# Patient Record
Sex: MALE | Race: WHITE | NOT HISPANIC OR LATINO | Employment: OTHER | URBAN - METROPOLITAN AREA
[De-identification: names, ages, dates, MRNs, and addresses within clinical notes are randomized per-mention and may not be internally consistent; named-entity substitution may affect disease eponyms.]

---

## 2024-11-20 ENCOUNTER — HOSPITAL ENCOUNTER (EMERGENCY)
Facility: HOSPITAL | Age: 74
Discharge: OTHER INSTITUTION | End: 2024-11-20
Attending: EMERGENCY MEDICINE
Payer: MEDICARE

## 2024-11-20 ENCOUNTER — APPOINTMENT (OUTPATIENT)
Dept: CARDIOLOGY | Facility: HOSPITAL | Age: 74
End: 2024-11-20
Payer: MEDICARE

## 2024-11-20 ENCOUNTER — APPOINTMENT (OUTPATIENT)
Dept: RADIOLOGY | Facility: HOSPITAL | Age: 74
End: 2024-11-20
Payer: MEDICARE

## 2024-11-20 VITALS
OXYGEN SATURATION: 95 % | DIASTOLIC BLOOD PRESSURE: 69 MMHG | WEIGHT: 200.62 LBS | SYSTOLIC BLOOD PRESSURE: 126 MMHG | BODY MASS INDEX: 29.71 KG/M2 | RESPIRATION RATE: 18 BRPM | HEART RATE: 57 BPM | HEIGHT: 69 IN | TEMPERATURE: 99.8 F

## 2024-11-20 DIAGNOSIS — R47.01 APHASIA: Primary | ICD-10-CM

## 2024-11-20 PROBLEM — F41.9 ANXIETY: Status: ACTIVE | Noted: 2024-11-04

## 2024-11-20 PROBLEM — G20.C PARKINSONISM (MULTI): Status: ACTIVE | Noted: 2023-06-30

## 2024-11-20 PROBLEM — R73.9 HYPERGLYCEMIA: Status: ACTIVE | Noted: 2020-08-22

## 2024-11-20 PROBLEM — H90.5 SENSORINEURAL HEARING LOSS: Status: ACTIVE | Noted: 2017-08-17

## 2024-11-20 PROBLEM — K57.30 DIVERTICULAR DISEASE OF COLON: Status: ACTIVE | Noted: 2022-09-03

## 2024-11-20 PROBLEM — E66.3 OVERWEIGHT WITH BODY MASS INDEX (BMI) 25.0-29.9: Status: ACTIVE | Noted: 2021-11-09

## 2024-11-20 PROBLEM — M14.60 CHARCOT'S JOINT: Status: ACTIVE | Noted: 2018-06-27

## 2024-11-20 PROBLEM — R51.9 CHRONIC DAILY HEADACHE: Status: ACTIVE | Noted: 2023-11-16

## 2024-11-20 PROBLEM — G20.A2 PARKINSON'S DISEASE WITH FLUCTUATING MANIFESTATIONS: Status: ACTIVE | Noted: 2024-03-14

## 2024-11-20 PROBLEM — R44.3 HALLUCINATIONS: Status: ACTIVE | Noted: 2024-11-04

## 2024-11-20 PROBLEM — R39.15 URGENCY OF URINATION: Status: ACTIVE | Noted: 2023-05-09

## 2024-11-20 PROBLEM — G31.84 MILD NEUROCOGNITIVE DISORDER: Status: ACTIVE | Noted: 2023-06-30

## 2024-11-20 PROBLEM — G62.9 NEUROPATHY: Status: ACTIVE | Noted: 2020-05-07

## 2024-11-20 PROBLEM — N28.1 RENAL CYST: Status: ACTIVE | Noted: 2019-10-04

## 2024-11-20 PROBLEM — G89.29 CHRONIC BILATERAL LOW BACK PAIN WITH BILATERAL SCIATICA: Status: ACTIVE | Noted: 2023-02-07

## 2024-11-20 PROBLEM — R20.2 PARESTHESIA OF LOWER EXTREMITY: Status: ACTIVE | Noted: 2021-02-11

## 2024-11-20 PROBLEM — M51.369 DEGENERATION OF LUMBAR INTERVERTEBRAL DISC: Status: ACTIVE | Noted: 2020-08-17

## 2024-11-20 PROBLEM — M54.41 CHRONIC BILATERAL LOW BACK PAIN WITH BILATERAL SCIATICA: Status: ACTIVE | Noted: 2023-02-07

## 2024-11-20 PROBLEM — M20.22 ACQUIRED HALLUX RIGIDUS OF LEFT FOOT: Status: ACTIVE | Noted: 2018-11-20

## 2024-11-20 PROBLEM — F41.1 GENERALIZED ANXIETY DISORDER: Status: ACTIVE | Noted: 2024-07-29

## 2024-11-20 PROBLEM — M17.11 UNILATERAL PRIMARY OSTEOARTHRITIS, RIGHT KNEE: Status: ACTIVE | Noted: 2018-05-15

## 2024-11-20 PROBLEM — D72.829 LEUKOCYTOSIS: Status: ACTIVE | Noted: 2022-08-10

## 2024-11-20 PROBLEM — R97.20 HIGH PROSTATE SPECIFIC ANTIGEN (PSA): Status: ACTIVE | Noted: 2021-11-23

## 2024-11-20 PROBLEM — R35.0 URINARY FREQUENCY: Status: ACTIVE | Noted: 2023-05-09

## 2024-11-20 PROBLEM — R41.3 MEMORY CHANGE: Status: ACTIVE | Noted: 2024-11-04

## 2024-11-20 PROBLEM — I73.00 RAYNAUD'S DISEASE: Status: ACTIVE | Noted: 2023-08-15

## 2024-11-20 PROBLEM — F41.0 PANIC DISORDER: Status: ACTIVE | Noted: 2024-05-23

## 2024-11-20 PROBLEM — M54.42 CHRONIC BILATERAL LOW BACK PAIN WITH BILATERAL SCIATICA: Status: ACTIVE | Noted: 2023-02-07

## 2024-11-20 PROBLEM — S46.219A TRAUMATIC RUPTURE OF BICEPS TENDON: Status: ACTIVE | Noted: 2020-10-18

## 2024-11-20 PROBLEM — R53.83 FATIGUE: Status: ACTIVE | Noted: 2020-08-17

## 2024-11-20 PROBLEM — F41.8 MIXED ANXIETY AND DEPRESSIVE DISORDER: Status: ACTIVE | Noted: 2023-03-17

## 2024-11-20 PROBLEM — M54.59 MECHANICAL LOW BACK PAIN: Status: ACTIVE | Noted: 2023-02-06

## 2024-11-20 PROBLEM — M54.12 CERVICAL RADICULOPATHY: Status: ACTIVE | Noted: 2020-08-17

## 2024-11-20 LAB
ALBUMIN SERPL BCP-MCNC: 3.6 G/DL (ref 3.4–5)
ALP SERPL-CCNC: 73 U/L (ref 33–136)
ALT SERPL W P-5'-P-CCNC: 5 U/L (ref 10–52)
AMPHETAMINES UR QL SCN: NORMAL
ANION GAP SERPL CALC-SCNC: 9 MMOL/L (ref 10–20)
APPEARANCE UR: CLEAR
APTT PPP: 30 SECONDS (ref 27–38)
AST SERPL W P-5'-P-CCNC: 17 U/L (ref 9–39)
BARBITURATES UR QL SCN: NORMAL
BASOPHILS # BLD AUTO: 0.04 X10*3/UL (ref 0–0.1)
BASOPHILS NFR BLD AUTO: 0.7 %
BENZODIAZ UR QL SCN: NORMAL
BILIRUB SERPL-MCNC: 0.6 MG/DL (ref 0–1.2)
BILIRUB UR STRIP.AUTO-MCNC: NEGATIVE MG/DL
BUN SERPL-MCNC: 19 MG/DL (ref 6–23)
BZE UR QL SCN: NORMAL
CALCIUM SERPL-MCNC: 8.5 MG/DL (ref 8.6–10.3)
CANNABINOIDS UR QL SCN: NORMAL
CARDIAC TROPONIN I PNL SERPL HS: 3 NG/L (ref 0–20)
CHLORIDE SERPL-SCNC: 106 MMOL/L (ref 98–107)
CO2 SERPL-SCNC: 22 MMOL/L (ref 21–32)
COLOR UR: YELLOW
CREAT SERPL-MCNC: 1 MG/DL (ref 0.5–1.3)
EGFRCR SERPLBLD CKD-EPI 2021: 78 ML/MIN/1.73M*2
EOSINOPHIL # BLD AUTO: 0.09 X10*3/UL (ref 0–0.4)
EOSINOPHIL NFR BLD AUTO: 1.5 %
ERYTHROCYTE [DISTWIDTH] IN BLOOD BY AUTOMATED COUNT: 13.2 % (ref 11.5–14.5)
FENTANYL+NORFENTANYL UR QL SCN: NORMAL
GLUCOSE BLD MANUAL STRIP-MCNC: 83 MG/DL (ref 74–99)
GLUCOSE SERPL-MCNC: 90 MG/DL (ref 74–99)
GLUCOSE UR STRIP.AUTO-MCNC: NORMAL MG/DL
HCT VFR BLD AUTO: 43.7 % (ref 41–52)
HGB BLD-MCNC: 15.2 G/DL (ref 13.5–17.5)
IMM GRANULOCYTES # BLD AUTO: 0.02 X10*3/UL (ref 0–0.5)
IMM GRANULOCYTES NFR BLD AUTO: 0.3 % (ref 0–0.9)
INR PPP: 1.3 (ref 0.9–1.1)
KETONES UR STRIP.AUTO-MCNC: ABNORMAL MG/DL
LEUKOCYTE ESTERASE UR QL STRIP.AUTO: NEGATIVE
LYMPHOCYTES # BLD AUTO: 1.58 X10*3/UL (ref 0.8–3)
LYMPHOCYTES NFR BLD AUTO: 25.8 %
MCH RBC QN AUTO: 29.3 PG (ref 26–34)
MCHC RBC AUTO-ENTMCNC: 34.8 G/DL (ref 32–36)
MCV RBC AUTO: 84 FL (ref 80–100)
METHADONE UR QL SCN: NORMAL
MONOCYTES # BLD AUTO: 0.82 X10*3/UL (ref 0.05–0.8)
MONOCYTES NFR BLD AUTO: 13.4 %
NEUTROPHILS # BLD AUTO: 3.58 X10*3/UL (ref 1.6–5.5)
NEUTROPHILS NFR BLD AUTO: 58.3 %
NITRITE UR QL STRIP.AUTO: NEGATIVE
NRBC BLD-RTO: 0 /100 WBCS (ref 0–0)
OPIATES UR QL SCN: NORMAL
OXYCODONE+OXYMORPHONE UR QL SCN: NORMAL
PCP UR QL SCN: NORMAL
PH UR STRIP.AUTO: 8 [PH]
PLATELET # BLD AUTO: 206 X10*3/UL (ref 150–450)
POTASSIUM SERPL-SCNC: 3.7 MMOL/L (ref 3.5–5.3)
PROT SERPL-MCNC: 5.6 G/DL (ref 6.4–8.2)
PROT UR STRIP.AUTO-MCNC: NEGATIVE MG/DL
PROTHROMBIN TIME: 14.2 SECONDS (ref 9.8–12.8)
RBC # BLD AUTO: 5.19 X10*6/UL (ref 4.5–5.9)
RBC # UR STRIP.AUTO: ABNORMAL /UL
RBC #/AREA URNS AUTO: >20 /HPF
SODIUM SERPL-SCNC: 133 MMOL/L (ref 136–145)
SP GR UR STRIP.AUTO: 1.02
UROBILINOGEN UR STRIP.AUTO-MCNC: NORMAL MG/DL
WBC # BLD AUTO: 6.1 X10*3/UL (ref 4.4–11.3)
WBC #/AREA URNS AUTO: ABNORMAL /HPF

## 2024-11-20 PROCEDURE — 99291 CRITICAL CARE FIRST HOUR: CPT | Performed by: EMERGENCY MEDICINE

## 2024-11-20 PROCEDURE — 80053 COMPREHEN METABOLIC PANEL: CPT | Performed by: EMERGENCY MEDICINE

## 2024-11-20 PROCEDURE — 85025 COMPLETE CBC W/AUTO DIFF WBC: CPT | Performed by: EMERGENCY MEDICINE

## 2024-11-20 PROCEDURE — 70450 CT HEAD/BRAIN W/O DYE: CPT | Mod: 59

## 2024-11-20 PROCEDURE — 2500000004 HC RX 250 GENERAL PHARMACY W/ HCPCS (ALT 636 FOR OP/ED): Mod: JW | Performed by: EMERGENCY MEDICINE

## 2024-11-20 PROCEDURE — 84484 ASSAY OF TROPONIN QUANT: CPT | Performed by: EMERGENCY MEDICINE

## 2024-11-20 PROCEDURE — 2500000004 HC RX 250 GENERAL PHARMACY W/ HCPCS (ALT 636 FOR OP/ED)

## 2024-11-20 PROCEDURE — 96375 TX/PRO/DX INJ NEW DRUG ADDON: CPT | Mod: 59

## 2024-11-20 PROCEDURE — 36415 COLL VENOUS BLD VENIPUNCTURE: CPT | Performed by: EMERGENCY MEDICINE

## 2024-11-20 PROCEDURE — 93005 ELECTROCARDIOGRAM TRACING: CPT

## 2024-11-20 PROCEDURE — 85730 THROMBOPLASTIN TIME PARTIAL: CPT | Performed by: EMERGENCY MEDICINE

## 2024-11-20 PROCEDURE — 70496 CT ANGIOGRAPHY HEAD: CPT | Mod: RCN

## 2024-11-20 PROCEDURE — 96372 THER/PROPH/DIAG INJ SC/IM: CPT

## 2024-11-20 PROCEDURE — 81001 URINALYSIS AUTO W/SCOPE: CPT | Performed by: EMERGENCY MEDICINE

## 2024-11-20 PROCEDURE — 70496 CT ANGIOGRAPHY HEAD: CPT | Mod: RCN | Performed by: RADIOLOGY

## 2024-11-20 PROCEDURE — 70498 CT ANGIOGRAPHY NECK: CPT | Mod: RCN | Performed by: RADIOLOGY

## 2024-11-20 PROCEDURE — 70450 CT HEAD/BRAIN W/O DYE: CPT | Performed by: RADIOLOGY

## 2024-11-20 PROCEDURE — 2550000001 HC RX 255 CONTRASTS: Performed by: EMERGENCY MEDICINE

## 2024-11-20 PROCEDURE — 96376 TX/PRO/DX INJ SAME DRUG ADON: CPT | Mod: 59

## 2024-11-20 PROCEDURE — 80307 DRUG TEST PRSMV CHEM ANLYZR: CPT | Performed by: EMERGENCY MEDICINE

## 2024-11-20 PROCEDURE — 82947 ASSAY GLUCOSE BLOOD QUANT: CPT | Mod: 59

## 2024-11-20 PROCEDURE — 85610 PROTHROMBIN TIME: CPT | Performed by: EMERGENCY MEDICINE

## 2024-11-20 PROCEDURE — 96374 THER/PROPH/DIAG INJ IV PUSH: CPT | Mod: 59

## 2024-11-20 RX ORDER — MIDAZOLAM HYDROCHLORIDE 1 MG/ML
2.5 INJECTION, SOLUTION INTRAMUSCULAR; INTRAVENOUS ONCE
Status: COMPLETED | OUTPATIENT
Start: 2024-11-20 | End: 2024-11-20

## 2024-11-20 RX ORDER — TADALAFIL 20 MG/1
TABLET ORAL
COMMUNITY
Start: 2024-01-03

## 2024-11-20 RX ORDER — LORAZEPAM 2 MG/ML
2 INJECTION INTRAMUSCULAR ONCE
Status: COMPLETED | OUTPATIENT
Start: 2024-11-20 | End: 2024-11-20

## 2024-11-20 RX ORDER — OMEPRAZOLE 20 MG/1
20 CAPSULE, DELAYED RELEASE ORAL
COMMUNITY
Start: 2024-05-03

## 2024-11-20 RX ORDER — TAMSULOSIN HYDROCHLORIDE 0.4 MG/1
1 CAPSULE ORAL 2 TIMES DAILY
COMMUNITY

## 2024-11-20 RX ORDER — LORAZEPAM 2 MG/ML
INJECTION INTRAMUSCULAR
Status: COMPLETED
Start: 2024-11-20 | End: 2024-11-20

## 2024-11-20 RX ORDER — LORAZEPAM 2 MG/ML
0.5 INJECTION INTRAMUSCULAR ONCE
Status: COMPLETED | OUTPATIENT
Start: 2024-11-20 | End: 2024-11-20

## 2024-11-20 RX ORDER — VIBEGRON 75 MG/1
TABLET, FILM COATED ORAL DAILY
COMMUNITY

## 2024-11-20 RX ORDER — GABAPENTIN 100 MG/1
100 CAPSULE ORAL 3 TIMES DAILY
COMMUNITY

## 2024-11-20 RX ORDER — LORAZEPAM 2 MG/ML
1 INJECTION INTRAMUSCULAR ONCE
Status: COMPLETED | OUTPATIENT
Start: 2024-11-20 | End: 2024-11-20

## 2024-11-20 RX ORDER — BUSPIRONE HYDROCHLORIDE 5 MG/1
1 TABLET ORAL 3 TIMES DAILY
COMMUNITY

## 2024-11-20 RX ORDER — RIVASTIGMINE 4.6 MG/24H
1 PATCH, EXTENDED RELEASE TRANSDERMAL
COMMUNITY

## 2024-11-20 RX ORDER — BACLOFEN 10 MG/1
TABLET ORAL
COMMUNITY
Start: 2024-07-01

## 2024-11-20 RX ORDER — MULTIVITAMIN
1 TABLET ORAL
COMMUNITY

## 2024-11-20 RX ORDER — OLANZAPINE 10 MG/2ML
2.5 INJECTION, POWDER, FOR SOLUTION INTRAMUSCULAR ONCE AS NEEDED
Status: COMPLETED | OUTPATIENT
Start: 2024-11-20 | End: 2024-11-20

## 2024-11-20 RX ORDER — CARBIDOPA AND LEVODOPA 25; 100 MG/1; MG/1
TABLET, EXTENDED RELEASE ORAL
COMMUNITY
Start: 2024-09-04

## 2024-11-20 RX ORDER — DEXMEDETOMIDINE HYDROCHLORIDE 4 UG/ML
.2-1.5 INJECTION, SOLUTION INTRAVENOUS CONTINUOUS
Status: DISCONTINUED | OUTPATIENT
Start: 2024-11-20 | End: 2024-11-21 | Stop reason: HOSPADM

## 2024-11-20 RX ORDER — SOLIFENACIN SUCCINATE 10 MG/1
10 TABLET, FILM COATED ORAL
COMMUNITY
Start: 2024-07-19

## 2024-11-20 RX ORDER — PREGABALIN 75 MG/1
1 CAPSULE ORAL 2 TIMES DAILY
COMMUNITY

## 2024-11-20 RX ORDER — VENLAFAXINE HYDROCHLORIDE 75 MG/1
1 CAPSULE, EXTENDED RELEASE ORAL DAILY
COMMUNITY

## 2024-11-20 RX ORDER — ESCITALOPRAM OXALATE 20 MG/1
TABLET ORAL
COMMUNITY

## 2024-11-20 RX ORDER — IBUPROFEN 800 MG/1
1 TABLET ORAL EVERY 8 HOURS PRN
COMMUNITY

## 2024-11-20 RX ORDER — ENTACAPONE 200 MG/1
TABLET ORAL
COMMUNITY
Start: 2024-08-29

## 2024-11-20 RX ORDER — OLANZAPINE 10 MG/2ML
INJECTION, POWDER, FOR SOLUTION INTRAMUSCULAR
Status: COMPLETED
Start: 2024-11-20 | End: 2024-11-20

## 2024-11-20 RX ORDER — CLONAZEPAM 0.5 MG/1
0.5 TABLET ORAL 3 TIMES DAILY
COMMUNITY
Start: 2024-09-17

## 2024-11-20 RX ORDER — CYCLOSPORINE 0.5 MG/ML
1 EMULSION OPHTHALMIC 2 TIMES DAILY
COMMUNITY
Start: 2023-09-18

## 2024-11-20 ASSESSMENT — PAIN - FUNCTIONAL ASSESSMENT
PAIN_FUNCTIONAL_ASSESSMENT: UNABLE TO SELF-REPORT
PAIN_FUNCTIONAL_ASSESSMENT: UNABLE TO SELF-REPORT
PAIN_FUNCTIONAL_ASSESSMENT: 0-10
PAIN_FUNCTIONAL_ASSESSMENT: 0-10

## 2024-11-20 NOTE — ED PROVIDER NOTES
HPI   Chief Complaint   Patient presents with   • Stroke       Presents to the emergency department department to altered mental status.  Last known normal at 12:50 this afternoon.  His wife left at 12:50.  She did see him on a ring doorbell camera walking around 215.  He then talk to a neighbor around 330.  He had difficulty talking and requested that they call for his wife to come home.  His wife came home and he was having significant trouble getting words out.  She tried giving him his Parkinson's medications.  This did not improve his symptoms.  He now is nearly mute.  He does follow some commands but not all.  He does not follow commands with upper extremities but did with lower extremities.  He did say his name to one of the paramedics in the department but did not answer any questions for me.  Patient has a history of Parkinson's and BPH.  Wife is here at bedside giving history.    There was initially a delay in calling stroke alert on this patient.  Paramedics gave report as a unknown last known normal.  Patient had stroke alert called after arrival.  CT was not performed until 5:22 PM.  At 5:22 PM patient would have been 4 hours and 32 minutes out from last known normal.  At this point he would be outside the window for tenecteplase.                        Kirit Coma Scale Score: 11                  Patient History   No past medical history on file.  No past surgical history on file.  No family history on file.  Social History     Tobacco Use   • Smoking status: Not on file   • Smokeless tobacco: Not on file   Substance Use Topics   • Alcohol use: Not on file   • Drug use: Not on file       Physical Exam   ED Triage Vitals [11/20/24 1705]   Temperature Heart Rate Respirations BP   36.7 °C (98.1 °F) 52 20 132/80      Pulse Ox Temp src Heart Rate Source Patient Position   (!) 91 % -- -- --      BP Location FiO2 (%)     -- --       Physical Exam  Vitals and nursing note reviewed.   Constitutional:        Appearance: Normal appearance. He is not ill-appearing.   HENT:      Head: Normocephalic and atraumatic.      Nose: Nose normal.      Mouth/Throat:      Mouth: Mucous membranes are moist.   Eyes:      Extraocular Movements: Extraocular movements intact.      Pupils: Pupils are equal, round, and reactive to light.   Cardiovascular:      Rate and Rhythm: Normal rate and regular rhythm.      Pulses: Normal pulses.      Heart sounds: Normal heart sounds.   Pulmonary:      Effort: Pulmonary effort is normal.      Breath sounds: Normal breath sounds.   Abdominal:      General: Abdomen is flat. Bowel sounds are normal.      Palpations: Abdomen is soft.      Tenderness: There is no abdominal tenderness. There is no guarding.   Musculoskeletal:      Cervical back: Normal range of motion.      Comments: Patient is moving all 4 extremities equally.  He is not following commands with upper extremities.  I can get him to hold each leg independently off the bed without drift.     Skin:     General: Skin is warm and dry.      Capillary Refill: Capillary refill takes less than 2 seconds.   Neurological:      Mental Status: He is alert.      Comments: Patient is nonverbal.  He has no facial droop.  He is moving upper and lower extremities.  I am unable to get him to follow commands to hold up upper extremities.  I am able to get him to hold up independently each lower extremity for 5 seconds without drift.  He has intact sensation to stimuli.  He withdraws from touch on the bottom of his feet.  He was able to tell with paramedic his name but unable to answer any questions for me.  Last known normal was 1 PM.    Patient has an NIH stroke scale of 10.  He does not appear to have visual field cuts because he responds to visual threat bilaterally.     Labs Reviewed - No data to display  Pain Management Panel           No data to display              No orders to display     ED Course & MDM   Diagnoses as of 11/20/24 9557   Aphasia        Medical Decision Making  Patient presents with altered mental status.  Last known normal was 1 PM.  Patient is evaluated for stroke in the emergency department.  Stroke orders are placed along with CT and CT angiogram of the head and neck.  Patient is currently hemodynamically stable.  Patient had CT head performed which shows no evidence of acute intracranial bleeding.  Patient was discussed with the stroke neurologist.  By the time CT was completed on the patient patient would be 4 hours and 32 minutes from last known normal.  Patient now was outside of the tenecteplase window.  Laboratory workup is performed along with CT angiogram of the head and neck.  Patient has an NIH stroke scale of 10 initially.  1740 On reevaluation patient is now attempting to verbalize but still has unclear speech.  He is now following commands more effectively with upper extremities.  He does not have apparent asymmetric weakness in the upper or lower extremities.    1830  Staff came to me and tells me that the patient did have a conversation with a family friend at 315.  At that time he had normal speech patterns.  We now have a last known normal of approximately 315 this afternoon.  At this time patient now is within the window for tenecteplase.  I had a bedside evaluation and conversation with the patient's wife.  And she is consenting for tenecteplase at this time.    Patient was discussed with the stroke neurologist.  At this time there is no evidence of large vessel occlusion.  Patient does require hospitalization and ICU setting also with neurology coverage.  We do not currently have neurology.  Patient is discussed with [facility] per family's request.  They are able to hospitalize him in an ICU bed.  Patient has gotten progressively more agitated here in the emergency department.  He has received a total of 4 mg of Ativan and is started on Precedex for calming of agitation.  Patient is also given 2.5  mg of Zyprexa IM.        Procedure  Critical Care    Performed by: Rachele Rincon MD  Authorized by: Rachele Rincon MD    Critical care provider statement:     Critical care time (minutes):  60    Critical care was necessary to treat or prevent imminent or life-threatening deterioration of the following conditions:  CNS failure or compromise    Critical care was time spent personally by me on the following activities:  Discussions with consultants, examination of patient, ordering and review of laboratory studies, ordering and review of radiographic studies and re-evaluation of patient's condition  ECG 12 lead    Performed by: Rachele Rincon MD  Authorized by: Rachele Rincon MD    Interpretation:     Details:  EKG was obtained at 5:40 PM.  It is sinus rhythm rate of 57.  No acute ST elevation.  IL interval is 186 and QTc is 429.       Rachele Rincon MD  11/20/24 2013

## 2024-11-21 LAB
ATRIAL RATE: 59 BPM
HOLD SPECIMEN: NORMAL
P AXIS: 86 DEGREES
PR INTERVAL: 186 MS
Q ONSET: 249 MS
QRS COUNT: 9 BEATS
QRS DURATION: 94 MS
QT INTERVAL: 440 MS
QTC CALCULATION(BAZETT): 429 MS
QTC FREDERICIA: 432 MS
R AXIS: -5 DEGREES
T AXIS: 51 DEGREES
T OFFSET: 469 MS
VENTRICULAR RATE: 57 BPM

## 2024-11-21 NOTE — PROGRESS NOTES
Emergency Medicine Transition of Care Note.    I received Edis Chan in signout from Dr. Rincon.  Please see the previous ED provider note for all HPI, PE and MDM up to the time of signout. This is in addition to the primary record.    In brief Edis Chan is an 74 y.o. male presenting for   Chief Complaint   Patient presents with    Stroke     At the time of signout we were awaiting: transfer to [facility] as of 11/21/24 0533   Aphasia       Medical Decision Making  Patient has become increasingly agitated since receiving TNK.  He has not cooperative in the way to tolerate oral medications.  Currently received multiple doses of Ativan, Zyprexa, and is on a Precedex drip.  Additionally he received a dose of Versed at the time of signout.  His agitation is improving with medications.  He is on cardiac monitor with normal respirations and oxygenation.    On reevaluation after the Versed the patient is much more calm.  I do believe he is stable for transfer without airway management.      Final diagnoses:   [R47.01] Aphasia           Procedure  Procedures    Miladys Duque DO